# Patient Record
Sex: FEMALE | Race: WHITE | NOT HISPANIC OR LATINO | Employment: UNEMPLOYED | URBAN - METROPOLITAN AREA
[De-identification: names, ages, dates, MRNs, and addresses within clinical notes are randomized per-mention and may not be internally consistent; named-entity substitution may affect disease eponyms.]

---

## 2022-05-23 ENCOUNTER — TRANSFERRED RECORDS (OUTPATIENT)
Dept: HEALTH INFORMATION MANAGEMENT | Facility: CLINIC | Age: 62
End: 2022-05-23

## 2022-06-08 ENCOUNTER — TRANSFERRED RECORDS (OUTPATIENT)
Dept: HEALTH INFORMATION MANAGEMENT | Facility: CLINIC | Age: 62
End: 2022-06-08

## 2022-06-15 ENCOUNTER — TRANSFERRED RECORDS (OUTPATIENT)
Dept: HEALTH INFORMATION MANAGEMENT | Facility: CLINIC | Age: 62
End: 2022-06-15

## 2022-06-16 ENCOUNTER — TRANSFERRED RECORDS (OUTPATIENT)
Dept: HEALTH INFORMATION MANAGEMENT | Facility: CLINIC | Age: 62
End: 2022-06-16

## 2022-06-20 ENCOUNTER — DOCUMENTATION ONLY (OUTPATIENT)
Dept: ONCOLOGY | Facility: CLINIC | Age: 62
End: 2022-06-20

## 2022-06-20 ENCOUNTER — PATIENT OUTREACH (OUTPATIENT)
Dept: ONCOLOGY | Facility: CLINIC | Age: 62
End: 2022-06-20

## 2022-06-20 ENCOUNTER — TRANSCRIBE ORDERS (OUTPATIENT)
Dept: OTHER | Age: 62
End: 2022-06-20

## 2022-06-20 DIAGNOSIS — N83.209 CYST OF OVARY, UNSPECIFIED LATERALITY: Primary | ICD-10-CM

## 2022-06-20 NOTE — PROGRESS NOTES
RECORDS STATUS - ALL OTHER DIAGNOSIS      Action    Action Taken 6/21/2022 9:45AM EUGENIE WEBER called the [facility] [phone] - Fax: [phone]- I faxed over a formal request for records and imaging    6/27/2022 8:29AM EUGENIE  [facility] records are in EPIC     RECORDS RECEIVED FROM: [facility]/ [facility]    DATE RECEIVED: 6/28/2022   NOTES STATUS DETAILS   OFFICE NOTE from referring provider Complete Epic  Referred by Leandra Giles from [facility]     OFFICE NOTE from medical oncologist Complete [facility] records are in EPIC   DISCHARGE SUMMARY from hospital     CLINICAL TRIAL TREATMENTS TO DATE     LABS     PATHOLOGY REPORTS     ANYTHING RELATED TO DIAGNOSIS Complete [facility] labs are in Epic   GENONOMIC TESTING     TYPE:     IMAGING (NEED IMAGES & REPORT)     CT SCANS Complete  CT Abdomen Pelvis 6/8/2022   MRI     MAMMO     ULTRASOUND Complete US Renal 5/23/2022    PET

## 2022-06-20 NOTE — PROGRESS NOTES
New Patient Hematology / Oncology Nurse Navigator Note     Referral Date: 22    Referring provider:   Leandra Giles from [facility]   ph: [phone] fax: [phone]     Referring Clinic/Organization: [facility]     Referred to: GynOnc    Requested provider (if applicable): First available - did not specify      Evaluation for :       Clinical History (per Nurse review of records provided):      CT 22 showin/23 Renal US (with incidental finding) showing:         Clinical Assessment / Barriers to Care (Per Nurse):    Pt has MS and difficulty traveling     Records Location: Faxed - Media tab/Scanned (not yet scanned, reviewed via Email)    Records Needed:   [facility] will need results prior to visit with GynOnc and possible US? But may not do US prior to visit with GynOnc    Additional testing needed prior to consult:      [facility]     Referral updates and Plan:   Contacted patient to discuss referral. Spoke with patient and her sister-in-law trying to coordinate visit this week. [facility] . Discussed appointment at [place] that day if possible, which patient feels will be too much for one day. She is hoping for  location.     Reviewed with Dr. Golden/RNCC to advise on urgency. Dr. Golden advised patient be scheduled in next available opening, unable to accommodate at  location this week.      Contacted patient/sister-in-law to discuss appointment at  location  which they accept. Will route to scheduling to coordinate visit as discussed at 10AM with Dr. Hernandez.     Marianne Andrew, BSN, RN, PHN, OCN  Hematology/Oncology Nurse Navigator  [facility]  [phone]

## 2022-06-20 NOTE — PROGRESS NOTES
Action June 20, 2022 11:05 AM ABT   Action Taken [facility] received and sent to HIM for upload

## 2022-06-27 RX ORDER — HEPARIN SODIUM 10000 [USP'U]/ML
5000 INJECTION, SOLUTION INTRAVENOUS; SUBCUTANEOUS
Status: CANCELLED | OUTPATIENT
Start: 2022-06-27

## 2022-06-27 RX ORDER — PHENAZOPYRIDINE HYDROCHLORIDE 100 MG/1
200 TABLET, FILM COATED ORAL ONCE
Status: CANCELLED | OUTPATIENT
Start: 2022-06-27 | End: 2022-06-27

## 2022-06-28 ENCOUNTER — LAB (OUTPATIENT)
Dept: ONCOLOGY | Facility: CLINIC | Age: 62
End: 2022-06-28
Attending: OBSTETRICS & GYNECOLOGY
Payer: MEDICARE

## 2022-06-28 ENCOUNTER — PRE VISIT (OUTPATIENT)
Dept: ONCOLOGY | Facility: CLINIC | Age: 62
End: 2022-06-28

## 2022-06-28 VITALS
RESPIRATION RATE: 16 BRPM | SYSTOLIC BLOOD PRESSURE: 132 MMHG | OXYGEN SATURATION: 97 % | HEART RATE: 59 BPM | DIASTOLIC BLOOD PRESSURE: 82 MMHG | TEMPERATURE: 97.6 F

## 2022-06-28 DIAGNOSIS — N83.8 OVARIAN MASS: Primary | ICD-10-CM

## 2022-06-28 LAB
ABO/RH(D): NORMAL
ALBUMIN SERPL-MCNC: 3.6 G/DL (ref 3.4–5)
ALP SERPL-CCNC: 103 U/L (ref 40–150)
ALT SERPL W P-5'-P-CCNC: 104 U/L (ref 0–50)
ANION GAP SERPL CALCULATED.3IONS-SCNC: 5 MMOL/L (ref 3–14)
ANTIBODY SCREEN: NEGATIVE
AST SERPL W P-5'-P-CCNC: 57 U/L (ref 0–45)
BASOPHILS # BLD AUTO: 0 10E3/UL (ref 0–0.2)
BASOPHILS NFR BLD AUTO: 0 %
BILIRUB SERPL-MCNC: 0.6 MG/DL (ref 0.2–1.3)
BUN SERPL-MCNC: 23 MG/DL (ref 7–30)
CALCIUM SERPL-MCNC: 9.2 MG/DL (ref 8.5–10.1)
CHLORIDE BLD-SCNC: 111 MMOL/L (ref 94–109)
CO2 SERPL-SCNC: 24 MMOL/L (ref 20–32)
CREAT SERPL-MCNC: 0.58 MG/DL (ref 0.52–1.04)
EOSINOPHIL # BLD AUTO: 0.1 10E3/UL (ref 0–0.7)
EOSINOPHIL NFR BLD AUTO: 1 %
ERYTHROCYTE [DISTWIDTH] IN BLOOD BY AUTOMATED COUNT: 15 % (ref 10–15)
GFR SERPL CREATININE-BSD FRML MDRD: >90 ML/MIN/1.73M2
GLUCOSE BLD-MCNC: 93 MG/DL (ref 70–99)
HCT VFR BLD AUTO: 45.3 % (ref 35–47)
HGB BLD-MCNC: 14.5 G/DL (ref 11.7–15.7)
IMM GRANULOCYTES # BLD: 0 10E3/UL
IMM GRANULOCYTES NFR BLD: 0 %
LYMPHOCYTES # BLD AUTO: 2.3 10E3/UL (ref 0.8–5.3)
LYMPHOCYTES NFR BLD AUTO: 26 %
MCH RBC QN AUTO: 30 PG (ref 26.5–33)
MCHC RBC AUTO-ENTMCNC: 32 G/DL (ref 31.5–36.5)
MCV RBC AUTO: 94 FL (ref 78–100)
MONOCYTES # BLD AUTO: 0.7 10E3/UL (ref 0–1.3)
MONOCYTES NFR BLD AUTO: 8 %
NEUTROPHILS # BLD AUTO: 5.7 10E3/UL (ref 1.6–8.3)
NEUTROPHILS NFR BLD AUTO: 65 %
NRBC # BLD AUTO: 0 10E3/UL
NRBC BLD AUTO-RTO: 0 /100
PLATELET # BLD AUTO: 219 10E3/UL (ref 150–450)
POTASSIUM BLD-SCNC: 3.9 MMOL/L (ref 3.4–5.3)
PROT SERPL-MCNC: 7.3 G/DL (ref 6.8–8.8)
RBC # BLD AUTO: 4.84 10E6/UL (ref 3.8–5.2)
SODIUM SERPL-SCNC: 140 MMOL/L (ref 133–144)
SPECIMEN EXPIRATION DATE: NORMAL
WBC # BLD AUTO: 8.9 10E3/UL (ref 4–11)

## 2022-06-28 PROCEDURE — 85025 COMPLETE CBC W/AUTO DIFF WBC: CPT | Performed by: OBSTETRICS & GYNECOLOGY

## 2022-06-28 PROCEDURE — 82040 ASSAY OF SERUM ALBUMIN: CPT | Performed by: OBSTETRICS & GYNECOLOGY

## 2022-06-28 PROCEDURE — G0463 HOSPITAL OUTPT CLINIC VISIT: HCPCS

## 2022-06-28 PROCEDURE — 99205 OFFICE O/P NEW HI 60 MIN: CPT | Performed by: OBSTETRICS & GYNECOLOGY

## 2022-06-28 PROCEDURE — 36415 COLL VENOUS BLD VENIPUNCTURE: CPT

## 2022-06-28 PROCEDURE — 86850 RBC ANTIBODY SCREEN: CPT | Performed by: OBSTETRICS & GYNECOLOGY

## 2022-06-28 PROCEDURE — 80053 COMPREHEN METABOLIC PANEL: CPT | Performed by: OBSTETRICS & GYNECOLOGY

## 2022-06-28 PROCEDURE — 86901 BLOOD TYPING SEROLOGIC RH(D): CPT | Performed by: OBSTETRICS & GYNECOLOGY

## 2022-06-28 RX ORDER — CARBOXYMETHYLCELLULOSE SODIUM 5 MG/ML
SOLUTION/ DROPS OPHTHALMIC
COMMUNITY
Start: 2021-07-23

## 2022-06-28 RX ORDER — IPRATROPIUM BROMIDE 42 UG/1
SPRAY, METERED NASAL
COMMUNITY
Start: 2022-06-01

## 2022-06-28 RX ORDER — ACETAMINOPHEN 500 MG
1000 TABLET ORAL
COMMUNITY
Start: 2022-01-19

## 2022-06-28 RX ORDER — CETIRIZINE HYDROCHLORIDE 10 MG/1
10 TABLET ORAL
COMMUNITY
Start: 2022-06-01

## 2022-06-28 RX ORDER — ERGOCALCIFEROL 1.25 MG/1
1250 CAPSULE ORAL
COMMUNITY
Start: 2022-06-02

## 2022-06-28 RX ORDER — BACLOFEN 10 MG/1
TABLET ORAL
COMMUNITY
Start: 2022-05-27

## 2022-06-28 RX ORDER — LIDOCAINE 40 MG/G
CREAM TOPICAL
COMMUNITY
Start: 2022-01-19

## 2022-06-28 RX ORDER — HYDROCODONE BITARTRATE AND ACETAMINOPHEN 5; 325 MG/1; MG/1
TABLET ORAL
COMMUNITY
Start: 2022-06-01

## 2022-06-28 RX ORDER — IBUPROFEN 600 MG/1
600 TABLET, FILM COATED ORAL
COMMUNITY
Start: 2022-04-12

## 2022-06-28 ASSESSMENT — PAIN SCALES - GENERAL: PAINLEVEL: MODERATE PAIN (5)

## 2022-06-28 NOTE — PROGRESS NOTES
Consult Notes on Referred Patient            RE: Rae Hernandez  : 1960  QUAN: 2022      I had the pleasure of seeing your patient Rae Hernandez here at the Gynecologic Cancer Clinic at the [facility] on 2022.  As you know she is a very pleasant 62 year old woman with a recent diagnosis of ovarian cyst.  Given these findings she was subsequently sent to the Gynecologic Cancer Clinic for new patient consultation.  She is accompanied today by her sister in law.    She was undergoing work up for urinary frequency/urgency and baseline incontinence and was found to have a large pelvic mass.  She denies any constipation, pain.  She is eating and drinking normally.  She does note she has had some abdominal bloating lately. Of note, she is undergoing evaluation for possible new liver disease as her LFT's were found to be mildly elevated on routine lab work.  She has a liver US scheduled for .    22:  US renal (personally reviewed):  Large cystic mass above the bladder measuring up to 14.2 cm and may contain echogenic debris.    22:  CT A/P (personally reviewed):  1.  Large ovarian cystic lesions in the pelvis measuring up to 15.4 cm.  Due to mass effect from the large size of the lesion, it is difficult to determine which ovary this mass originates from.  No metastatic disease identified.      22:   = 8    Obstetrics and Gynecology History:  G0  Menopause at age 52, no HRT  No PMB      Past Medical History:  Past Medical History:   Diagnosis Date     MS (multiple sclerosis) (H)      Neurogenic bladder      Neurogenic bowel      Spastic hemiplegia (H)        Past Surgical History:  Past Surgical History:   Procedure Laterality Date     TONSILLECTOMY         Health Maintenance:  Last Pap Smear: 20 years ago              Result: normal  She has not had a history of abnormal Pap smears.    Last Mammogram: Many years ago              Result: normal      She has not had  a history of abnormal mammograms.    Last Colonoscopy: Never-pt declines       Social History:  Lives with her sister in law and brother, feels safe at home.  Retired from the [employer].  Enjoys feeding the birds, gardening.  Her wife passed away in 2020 and she moved to [place] from [place].  Does not have an advanced directive on file and would like her sister in law to be her POA.  Would like full resuscitation if reversible cause is identified, however would not like to be kept on life sustaining measures long-term.     Family History:   The patient's family history is significant for.  Family History   Problem Relation Age of Onset     Lung Cancer Mother      Breast Cancer Sister      Lung Cancer Sister          Physical Exam:   /82   Pulse 59   Temp 97.6  F (36.4  C) (Tympanic)   Resp 16   SpO2 97%   There is no height or weight on file to calculate BMI.    General Appearance: healthy and alert, no distress        Cardiovascular: regular rate and rhythm    Respiratory: lungs clear     Labs:  WBC 8.9 with ANC 5.7.  Hemoglobin 14.5.  Platelets 219.  Creatinine 0.58.  Potassium 3.9.  Magnesium -.  Remainder of electrolytes within normal limits.  AST 57, , alkaline phosphatase 103, total bilirubin 0.6.  Albumin 3.6.      Assessment:    Rae Hernandez is a 62 year old woman with a new diagnosis of ovarian cyst.     A total of 60 minutes was spent on patient care today.       Plan:     1.)    Ovarian cyst-we reviewed the etiologies including benign, malignant and borderline.  Given the normal  and CT without evidence of mestastatic disease, we discussed that this is most likely a benign cyst.  However, given the size, it is unlikely to resolve spontaneously. She is also has significant urinary symptoms secondary to mass effect.  We discussed the rationale for not performing a biopsy of the ovary.  We discussed my recommendation for bilateral oophorectomy regardless of pathology given her age.  We discussed  the role of frozen section analysis and that further surgical procedures would be based on these findings.  Risks, benefits, indications and alternatives were reviewed.  All her questions were answered and she will undergo laparoscopic removal of both ovaries and fallopian tubes, possible cancer surgery on 7/27 at [facility].  She will have a pre-operative optimization visit with her PCP prior to surgery especially in light of her newly elevated LFT.     2.) Genetic risk factors were assessed and the patient does not meet the qualifications for a referral unless malignancy is found.      3.) Labs and/or tests ordered include:  CBC, CMP, T/S, EKG.     4.) Health maintenance issues addressed today include pt is due for colonoscopy, mammogram and pap which she declines all screening exams.    5.) Pre-op teaching was completed today.        Thank you for allowing us to participate in the care of your patient.         Sincerely,    Adenike Hernandez MD  Gynecologic Oncology  [facility]       CC

## 2022-06-28 NOTE — LETTER
2022         RE: Rae Hernandez  [address] [place] [address]        Dear Colleague,    Thank you for referring your patient, Rae Hernandez, to the [facility]. Please see a copy of my visit note below.    Consult Notes on Referred Patient            RE: Rae Hernandez  : 1960  QUAN: 2022      I had the pleasure of seeing your patient Rae Hernandez here at the Gynecologic Cancer Clinic at the [facility] on 2022.  As you know she is a very pleasant 62 year old woman with a recent diagnosis of ovarian cyst.  Given these findings she was subsequently sent to the Gynecologic Cancer Clinic for new patient consultation.  She is accompanied today by her sister in law.    She was undergoing work up for urinary frequency/urgency and baseline incontinence and was found to have a large pelvic mass.  She denies any constipation, pain.  She is eating and drinking normally.  She does note she has had some abdominal bloating lately. Of note, she is undergoing evaluation for possible new liver disease as her LFT's were found to be mildly elevated on routine lab work.  She has a liver US scheduled for .    22:  US renal (personally reviewed):  Large cystic mass above the bladder measuring up to 14.2 cm and may contain echogenic debris.    22:  CT A/P (personally reviewed):  1.  Large ovarian cystic lesions in the pelvis measuring up to 15.4 cm.  Due to mass effect from the large size of the lesion, it is difficult to determine which ovary this mass originates from.  No metastatic disease identified.      22:   = 8    Obstetrics and Gynecology History:  G0  Menopause at age 52, no HRT  No PMB      Past Medical History:  Past Medical History:   Diagnosis Date     MS (multiple sclerosis) (H)      Neurogenic bladder      Neurogenic bowel      Spastic hemiplegia (H)        Past Surgical History:  Past Surgical History:    Procedure Laterality Date     TONSILLECTOMY         Health Maintenance:  Last Pap Smear: 20 years ago              Result: normal  She has not had a history of abnormal Pap smears.    Last Mammogram: Many years ago              Result: normal      She has not had a history of abnormal mammograms.    Last Colonoscopy: Never-pt declines       Social History:  Lives with her sister in law and brother, feels safe at home.  Retired from the [employer].  Enjoys feeding the birds, gardening.  Her wife passed away in 2020 and she moved to [place] from [place].  Does not have an advanced directive on file and would like her sister in law to be her POA.  Would like full resuscitation if reversible cause is identified, however would not like to be kept on life sustaining measures long-term.     Family History:   The patient's family history is significant for.  Family History   Problem Relation Age of Onset     Lung Cancer Mother      Breast Cancer Sister      Lung Cancer Sister          Physical Exam:   /82   Pulse 59   Temp 97.6  F (36.4  C) (Tympanic)   Resp 16   SpO2 97%   There is no height or weight on file to calculate BMI.    General Appearance: healthy and alert, no distress        Cardiovascular: regular rate and rhythm    Respiratory: lungs clear     Labs:  WBC 8.9 with ANC 5.7.  Hemoglobin 14.5.  Platelets 219.  Creatinine 0.58.  Potassium 3.9.  Magnesium -.  Remainder of electrolytes within normal limits.  AST 57, , alkaline phosphatase 103, total bilirubin 0.6.  Albumin 3.6.      Assessment:    Rae Hernandez is a 62 year old woman with a new diagnosis of ovarian cyst.     A total of 60 minutes was spent on patient care today.       Plan:     1.)    Ovarian cyst-we reviewed the etiologies including benign, malignant and borderline.  Given the normal  and CT without evidence of mestastatic disease, we discussed that this is most likely a benign cyst.  However, given the size, it is unlikely to resolve  spontaneously. She is also has significant urinary symptoms secondary to mass effect.  We discussed the rationale for not performing a biopsy of the ovary.  We discussed my recommendation for bilateral oophorectomy regardless of pathology given her age.  We discussed the role of frozen section analysis and that further surgical procedures would be based on these findings.  Risks, benefits, indications and alternatives were reviewed.  All her questions were answered and she will undergo laparoscopic removal of both ovaries and fallopian tubes, possible cancer surgery on 7/27 at [facility].  She will have a pre-operative optimization visit with her PCP prior to surgery especially in light of her newly elevated LFT.     2.) Genetic risk factors were assessed and the patient does not meet the qualifications for a referral unless malignancy is found.      3.) Labs and/or tests ordered include:  CBC, CMP, T/S, EKG.     4.) Health maintenance issues addressed today include pt is due for colonoscopy, mammogram and pap which she declines all screening exams.    5.) Pre-op teaching was completed today.        Thank you for allowing us to participate in the care of your patient.         Sincerely,    Adenike Hernandez MD  Gynecologic Oncology  [facility]       CC          Again, thank you for allowing me to participate in the care of your patient.        Sincerely,        Waylon Hernandez MD

## 2022-06-28 NOTE — PROGRESS NOTES
Medical Assistant Note:  Rae Hernandez presents today for BLOOD DRAW.    Patient seen by provider today: Yes: .   present during visit today: Not Applicable.    Concerns: No Concerns.    Procedure:  Lab draw site: right antecub, Needle type: butterfly, Gauge: 23.    Post Assessment:  Labs drawn without difficulty: Yes.    Discharge Plan:  Departure Mode: Wheelchair.    Face to Face Time: 10.    Natali Tovar, CMA

## 2022-06-28 NOTE — PATIENT INSTRUCTIONS
You have been scheduled for surgery on: 7/27 at [facility]    Diagnosis:  Ovarian mass    The surgical procedure is: laparoscopic removal of both ovaries and fallopian tubes, possible cancer surgery    Anticipated length of surgery: 1-3 hrs.  You will be in the recovery room for approximately 2-3 hrs after surgery.  Your family will not be able to see you until after you leave the recovery room.    Length of hospital stay:  This is an outpatient, extended stay surgery.  You will be discharged same day if you meet criteria for discharge.  If you have a larger surgical incision, you will need to be in the hospital 2-3 days.  ______________________________________________________________________    Preparation for Surgery:    To Schedule   1.  Labs:  CBC, CMP, T/S, orders placed, please perform today   2.  EKG:  Order placed, please perform today   3.  Post-operative exam with me 1-2 weeks following surgery      Postoperative Restrictions:  No heavy lifting >20lbs for six weeks, nothing in the vagina (no tampons, no intercourse, no douching) for eight weeks only if hysterectomy is required.     Postoperative visit:  Return to clinic 1-2 weeks after surgery for post operative visit.      Please contact the clinic with any questions or concerns.    Adenike Hernandez MD  Gynecologic Oncology  [facility]

## 2022-06-28 NOTE — NURSING NOTE
Oncology Rooming Note    June 28, 2022 10:22 AM   Rae Hernandez is a 62 year old female who presents for:    Chief Complaint   Patient presents with     Oncology Clinic Visit     New Patient     Initial Vitals: /82   Pulse 59   Temp 97.6  F (36.4  C) (Tympanic)   Resp 16   SpO2 97%  There is no height or weight on file to calculate BMI. There is no height or weight on file to calculate BSA.  Moderate Pain (5) Comment: Data Unavailable   No LMP recorded.  Allergies reviewed: Yes  Medications reviewed: Yes    Medications: Medication refills not needed today.  Pharmacy name entered into EPIC: Data Unavailable    Clinical concerns: new patient       Natali Tovar, CMA

## 2022-07-05 ENCOUNTER — TELEPHONE (OUTPATIENT)
Dept: ONCOLOGY | Facility: CLINIC | Age: 62
End: 2022-07-05

## 2022-07-05 NOTE — TELEPHONE ENCOUNTER
RN Care Coordinator: Gerda Galarza; [phone]    Surgery is scheduled with Dr. Hernandez on 7/27 at the [facility]  Scheduled per surgeon request    H&P to be completed by Surgeon     COVID-19 test: patient to complete an at-home test 1-2 days prior to scheduled date and bring a picture of negative results or call [phone] option # 7 to schedule a PCR test within 4 days of the scheduled date     Post-op: 8/9, in person visit    Patient will receive a phone call from pre-admission nurses 1-2 days prior to surgery with arrival and start time.    I spoke with the patient and was able to confirm the scheduled information. No further action needed at this time.    Surgery packet was provided by the RNCC during appointment     

## 2022-07-07 NOTE — TELEPHONE ENCOUNTER
David Connors, MD Otis Silver Kayla 2 days ago     CR    Its fine that she had steroid injection today.  She will definitely need to take out her dentures during the surgery     Pt called to discuss questions pt had for Dr Hernandez and response.  Left message on mobile number to call back to discuss.  Writer would also like to arrange surgical eduction with pt over the phone on 7/11/2022 if possible.    Gerda Galarza RN, BSN, OCN

## 2022-07-08 ENCOUNTER — PATIENT OUTREACH (OUTPATIENT)
Dept: ONCOLOGY | Facility: CLINIC | Age: 62
End: 2022-07-08

## 2022-07-08 NOTE — TELEPHONE ENCOUNTER
Adenike Chinchilla MD Tschida, Kayla 2 days ago      CR     Its fine that she had steroid injection today.  She will definitely need to take out her dentures during the surgery     Patient called back today.  Reviewed above information with her.  Patient voiced understanding.  Pre-op education also completed with patient today.  See pre-op instructions note.    While on the phone with patient doing pre-op education, she explained that she has mobility issues and it would be very difficult for her to do 1 shower with the CHG antibacterial soap and impossible to do 2 showers (one the night before surgery and one the morning of surgery).  Pt went on to explain that she has mobility issues and has someone come to her residence to help her do a shower once a week.  Encouraged patient to do the best that she can do with the resources she has available to her.  Explained the importance and reasoning behind the showers with the CHG soap to reduce risk of infection.  Writer will route message to Dr. Hernandez and Dr. David Box's RNCC, for any further advice or recommendations.      Janki Ohara, RN, BSN    RN Care Coordinator  [facility]  [phone]

## 2022-07-08 NOTE — PROGRESS NOTES
[facility]: Cancer Care Plan of Care Education Note                                    Discussion with Patient:                                                      Pre-op education completed with patient for surgery with Dr. Hernandez scheduled on 7/27/22.  Patient scheduled for laparoscopic removal of both ovaries and fallopian tubes, possible cancer surgery.      Assessment:                                                      Assessment completed with:: Patient;Other    Current living arrangement       Plan of Care Education   Diagnosis:: Ovarian Mass  Does patient understand diagnosis?: Yes  Does patient understand treatment plan/regimen?: Yes  Safety/self care at home reviewed with patient:: Yes  Coping - concerns/fears reviewed with patient:: Yes  Plan of Care:: MD follow-up appointment  When to call provider:: Bleeding;Increased shortness of breath;New/worsening pain;Shaking chills;Temperature >100.4F;Uncontrolled diarrhea/constipation;Uncontrolled nausea/vomiting    Evaluation of Learning  Patient Education Provided: Yes  Readiness:: Acceptance  Method:: Literature;Explanation  Response:: Verbalizes understanding      Intervention/Education provided during outreach:                                                       GYN ONC Pre-Op Education - Nursing     Relevant Diagnosis: Ovarian Mass    Teaching Topic: pre-op education    Teaching Concerns addressed: Yes    Patient verbalizes understanding the following:   Reason for the appointment, diagnosis and treatment plan:   Yes   Knowledge of proper use of medications and conditions for which they are ordered (with special attention to potential side effects or drug interactions): Yes   Which situations necessitate calling provider and whom to contact: Yes       Nutritional needs and diet plan:  Yes      Pain management techniques:  Yes  Diet:  Yes     Infection Prevention:  Patient verbalized understanding the following:   Pre-Op CHG Bathing Instructions:  Yes  Surgical procedure site care taught:   Yes   Signs and symptoms of infection taught: Yes     Instructional Materials Used/Given:  [facility] Preparing for Your Surgery  Showering or Bathing before Surgery Instructions & CHG Product (2 bottles)  Home Care after Major Abdominal or Vaginal Surgery  Tips to Increase the Protein in Your Diet  Using [employer]  Phone number for [facility]     Comments:  Met with pt for pre-op teaching over the phone for surgery on 7/27/22 at [facility]:  Reviewed Covid 19 testing instructions: COVID-19 test: patient to complete an at-home test 1-2 days prior to scheduled date and bring a picture of negative results or call [phone] option # 7 to schedule a PCR test within 4 days of the scheduled date  Reviewed NPO restrictions prior to surgery with pt (No food or milk products 8 hours prior to surgery; Only clear liquids up to 2 hours prior to surgery i.e., water, black coffee, tea, apple juice).  Also reviewed medications that must be held for at least 7 days prior to surgery (Aspirin, Ibuprofen, Naproxen, Fish oil/Flax seed oil, Multivitamin/Vit. E, or any other product containing aspirin, or NSAIDs).  Per Dr Hernandez, pt should her medications with PCP during pre-op appointment. Pt will need to see Dr Hernandez, 1-2 weeks after her surgery(scheduled 8/9/22). Pt will need someone to drive her home after surgery, and someone to stay with her for at least 24 hours after she arrives home. Reviewed with pt signs and symptoms that would warrant contacting provider immediately.  Also reviewed lifting restrictions after surgery with pt.  Pt had the opportunity to ask questions, and all questions were answered to her satisfaction.  Pre-op form was faxed to the [facility] pre-admissions at [phone].  Patient verbalized understanding and agreement with plan.  Pt was instructed to call the clinic with any questions, concerns, or worsening symptoms.     Janki Ohara, RN, BSN    RN  Care Coordinator  [facility]  [phone]

## 2022-07-13 NOTE — TELEPHONE ENCOUNTER
David Connors, Adenike Bailon MD  You; Janki Ohara, RN 5 days ago     CR    Thanks. This is fine. Whenever the shower she takes closest to coming to the hospital is she should use that soap. Then we have wipes that we can do a sponge bath with nursing when she gets to the hospital     Pt called and instructed as noted.  Pt states she was able to change her caregiver appt at home and he will come to the home on the day prior to surgery for first anti-bacterial shower.  Pt informed that writer will contact preop nurse about completing bath on morning of surgery as noted above.  Pt verbalized understanding of plan.    Gerda Galarza, RN, BSN, OCN

## 2022-07-22 ENCOUNTER — TRANSFERRED RECORDS (OUTPATIENT)
Dept: HEALTH INFORMATION MANAGEMENT | Facility: CLINIC | Age: 62
End: 2022-07-22

## 2022-07-26 ENCOUNTER — ANESTHESIA EVENT (OUTPATIENT)
Dept: SURGERY | Facility: CLINIC | Age: 62
End: 2022-07-26
Payer: COMMERCIAL

## 2022-07-27 ENCOUNTER — HOSPITAL ENCOUNTER (OUTPATIENT)
Facility: CLINIC | Age: 62
Discharge: HOME | End: 2022-07-27
Attending: OBSTETRICS & GYNECOLOGY | Admitting: OBSTETRICS & GYNECOLOGY
Payer: COMMERCIAL

## 2022-07-27 ENCOUNTER — ANESTHESIA (OUTPATIENT)
Dept: SURGERY | Facility: CLINIC | Age: 62
End: 2022-07-27
Payer: COMMERCIAL

## 2022-07-27 VITALS
BODY MASS INDEX: 27.63 KG/M2 | RESPIRATION RATE: 14 BRPM | HEART RATE: 103 BPM | DIASTOLIC BLOOD PRESSURE: 91 MMHG | TEMPERATURE: 98.2 F | SYSTOLIC BLOOD PRESSURE: 156 MMHG | OXYGEN SATURATION: 98 % | WEIGHT: 193 LBS | HEIGHT: 70 IN

## 2022-07-27 DIAGNOSIS — Z90.722 S/P BSO (BILATERAL SALPINGO-OOPHORECTOMY): Primary | ICD-10-CM

## 2022-07-27 PROBLEM — N83.8 OVARIAN MASS: Status: ACTIVE | Noted: 2022-07-27

## 2022-07-27 LAB
GLUCOSE BLDC GLUCOMTR-MCNC: 86 MG/DL (ref 70–99)
PATH REPORT.COMMENTS IMP SPEC: NORMAL
PATH REPORT.FINAL DX SPEC: NORMAL
PATH REPORT.GROSS SPEC: NORMAL
PATH REPORT.MICROSCOPIC SPEC OTHER STN: NORMAL
PATH REPORT.RELEVANT HX SPEC: NORMAL

## 2022-07-27 PROCEDURE — 710N000012 HC RECOVERY PHASE 2, PER MINUTE: Performed by: OBSTETRICS & GYNECOLOGY

## 2022-07-27 PROCEDURE — 88112 CYTOPATH CELL ENHANCE TECH: CPT | Mod: TC | Performed by: OBSTETRICS & GYNECOLOGY

## 2022-07-27 PROCEDURE — 999N000141 HC STATISTIC PRE-PROCEDURE NURSING ASSESSMENT: Performed by: OBSTETRICS & GYNECOLOGY

## 2022-07-27 PROCEDURE — 250N000013 HC RX MED GY IP 250 OP 250 PS 637: Performed by: ANESTHESIOLOGY

## 2022-07-27 PROCEDURE — 88112 CYTOPATH CELL ENHANCE TECH: CPT | Mod: 26 | Performed by: PATHOLOGY

## 2022-07-27 PROCEDURE — 360N000076 HC SURGERY LEVEL 3, PER MIN: Performed by: OBSTETRICS & GYNECOLOGY

## 2022-07-27 PROCEDURE — 250N000011 HC RX IP 250 OP 636: Performed by: STUDENT IN AN ORGANIZED HEALTH CARE EDUCATION/TRAINING PROGRAM

## 2022-07-27 PROCEDURE — 88331 PATH CONSLTJ SURG 1 BLK 1SPC: CPT | Mod: TC | Performed by: OBSTETRICS & GYNECOLOGY

## 2022-07-27 PROCEDURE — 82962 GLUCOSE BLOOD TEST: CPT

## 2022-07-27 PROCEDURE — 250N000009 HC RX 250: Performed by: STUDENT IN AN ORGANIZED HEALTH CARE EDUCATION/TRAINING PROGRAM

## 2022-07-27 PROCEDURE — 272N000001 HC OR GENERAL SUPPLY STERILE: Performed by: OBSTETRICS & GYNECOLOGY

## 2022-07-27 PROCEDURE — 250N000013 HC RX MED GY IP 250 OP 250 PS 637: Performed by: OBSTETRICS & GYNECOLOGY

## 2022-07-27 PROCEDURE — 710N000010 HC RECOVERY PHASE 1, LEVEL 2, PER MIN: Performed by: OBSTETRICS & GYNECOLOGY

## 2022-07-27 PROCEDURE — 250N000011 HC RX IP 250 OP 636: Performed by: ANESTHESIOLOGY

## 2022-07-27 PROCEDURE — 250N000025 HC SEVOFLURANE, PER MIN: Performed by: OBSTETRICS & GYNECOLOGY

## 2022-07-27 PROCEDURE — 250N000011 HC RX IP 250 OP 636: Performed by: OBSTETRICS & GYNECOLOGY

## 2022-07-27 PROCEDURE — 88305 TISSUE EXAM BY PATHOLOGIST: CPT | Mod: TC | Performed by: OBSTETRICS & GYNECOLOGY

## 2022-07-27 PROCEDURE — 258N000003 HC RX IP 258 OP 636: Performed by: STUDENT IN AN ORGANIZED HEALTH CARE EDUCATION/TRAINING PROGRAM

## 2022-07-27 PROCEDURE — 370N000017 HC ANESTHESIA TECHNICAL FEE, PER MIN: Performed by: OBSTETRICS & GYNECOLOGY

## 2022-07-27 RX ORDER — ONDANSETRON 2 MG/ML
INJECTION INTRAMUSCULAR; INTRAVENOUS PRN
Status: DISCONTINUED | OUTPATIENT
Start: 2022-07-27 | End: 2022-07-27

## 2022-07-27 RX ORDER — PSEUDOEPHEDRINE HCL 30 MG
30 TABLET ORAL EVERY 4 HOURS PRN
COMMUNITY

## 2022-07-27 RX ORDER — PROPOFOL 10 MG/ML
INJECTION, EMULSION INTRAVENOUS PRN
Status: DISCONTINUED | OUTPATIENT
Start: 2022-07-27 | End: 2022-07-27

## 2022-07-27 RX ORDER — OXYCODONE HYDROCHLORIDE 5 MG/1
5 TABLET ORAL
Status: DISCONTINUED | OUTPATIENT
Start: 2022-07-27 | End: 2022-07-27 | Stop reason: HOSPADM

## 2022-07-27 RX ORDER — SODIUM CHLORIDE, SODIUM LACTATE, POTASSIUM CHLORIDE, CALCIUM CHLORIDE 600; 310; 30; 20 MG/100ML; MG/100ML; MG/100ML; MG/100ML
INJECTION, SOLUTION INTRAVENOUS CONTINUOUS
Status: DISCONTINUED | OUTPATIENT
Start: 2022-07-27 | End: 2022-07-27 | Stop reason: HOSPADM

## 2022-07-27 RX ORDER — IBUPROFEN 200 MG
800 TABLET ORAL ONCE
Status: DISCONTINUED | OUTPATIENT
Start: 2022-07-27 | End: 2022-07-27 | Stop reason: HOSPADM

## 2022-07-27 RX ORDER — ALBUTEROL SULFATE 0.83 MG/ML
2.5 SOLUTION RESPIRATORY (INHALATION) EVERY 4 HOURS PRN
Status: DISCONTINUED | OUTPATIENT
Start: 2022-07-27 | End: 2022-07-27 | Stop reason: HOSPADM

## 2022-07-27 RX ORDER — DEXAMETHASONE SODIUM PHOSPHATE 4 MG/ML
INJECTION, SOLUTION INTRA-ARTICULAR; INTRALESIONAL; INTRAMUSCULAR; INTRAVENOUS; SOFT TISSUE PRN
Status: DISCONTINUED | OUTPATIENT
Start: 2022-07-27 | End: 2022-07-27

## 2022-07-27 RX ORDER — HEPARIN SODIUM 5000 [USP'U]/.5ML
5000 INJECTION, SOLUTION INTRAVENOUS; SUBCUTANEOUS
Status: COMPLETED | OUTPATIENT
Start: 2022-07-27 | End: 2022-07-27

## 2022-07-27 RX ORDER — LIDOCAINE HYDROCHLORIDE 20 MG/ML
INJECTION, SOLUTION INFILTRATION; PERINEURAL PRN
Status: DISCONTINUED | OUTPATIENT
Start: 2022-07-27 | End: 2022-07-27

## 2022-07-27 RX ORDER — HYDROMORPHONE HCL IN WATER/PF 6 MG/30 ML
0.2 PATIENT CONTROLLED ANALGESIA SYRINGE INTRAVENOUS EVERY 5 MIN PRN
Status: DISCONTINUED | OUTPATIENT
Start: 2022-07-27 | End: 2022-07-27 | Stop reason: HOSPADM

## 2022-07-27 RX ORDER — IBUPROFEN 800 MG/1
800 TABLET, FILM COATED ORAL EVERY 6 HOURS PRN
Qty: 30 TABLET | Refills: 0 | COMMUNITY
Start: 2022-07-27

## 2022-07-27 RX ORDER — ACETAMINOPHEN 325 MG/1
975 TABLET ORAL ONCE
Status: COMPLETED | OUTPATIENT
Start: 2022-07-27 | End: 2022-07-27

## 2022-07-27 RX ORDER — OXYCODONE HYDROCHLORIDE 5 MG/1
5 TABLET ORAL EVERY 6 HOURS PRN
Qty: 6 TABLET | Refills: 0 | Status: SHIPPED | OUTPATIENT
Start: 2022-07-27 | End: 2022-07-30

## 2022-07-27 RX ORDER — HYDRALAZINE HYDROCHLORIDE 20 MG/ML
2.5-5 INJECTION INTRAMUSCULAR; INTRAVENOUS EVERY 10 MIN PRN
Status: DISCONTINUED | OUTPATIENT
Start: 2022-07-27 | End: 2022-07-27 | Stop reason: HOSPADM

## 2022-07-27 RX ORDER — FENTANYL CITRATE 50 UG/ML
INJECTION, SOLUTION INTRAMUSCULAR; INTRAVENOUS PRN
Status: DISCONTINUED | OUTPATIENT
Start: 2022-07-27 | End: 2022-07-27

## 2022-07-27 RX ORDER — METHOCARBAMOL 100 MG/ML
1000 INJECTION, SOLUTION INTRAMUSCULAR; INTRAVENOUS
Status: DISCONTINUED | OUTPATIENT
Start: 2022-07-27 | End: 2022-07-27 | Stop reason: HOSPADM

## 2022-07-27 RX ORDER — AMOXICILLIN 250 MG
1-2 CAPSULE ORAL 2 TIMES DAILY
Qty: 30 TABLET | Refills: 0 | Status: SHIPPED | OUTPATIENT
Start: 2022-07-27

## 2022-07-27 RX ORDER — OXYCODONE HYDROCHLORIDE 5 MG/1
5 TABLET ORAL
Status: COMPLETED | OUTPATIENT
Start: 2022-07-27 | End: 2022-07-27

## 2022-07-27 RX ORDER — ACETAMINOPHEN 325 MG/1
975 TABLET ORAL EVERY 6 HOURS PRN
Qty: 50 TABLET | Refills: 0 | COMMUNITY
Start: 2022-07-27

## 2022-07-27 RX ORDER — PHENAZOPYRIDINE HYDROCHLORIDE 200 MG/1
200 TABLET, FILM COATED ORAL ONCE
Status: COMPLETED | OUTPATIENT
Start: 2022-07-27 | End: 2022-07-27

## 2022-07-27 RX ORDER — DEXMEDETOMIDINE HYDROCHLORIDE 4 UG/ML
INJECTION, SOLUTION INTRAVENOUS PRN
Status: DISCONTINUED | OUTPATIENT
Start: 2022-07-27 | End: 2022-07-27

## 2022-07-27 RX ORDER — MAGNESIUM SULFATE HEPTAHYDRATE 40 MG/ML
INJECTION, SOLUTION INTRAVENOUS PRN
Status: DISCONTINUED | OUTPATIENT
Start: 2022-07-27 | End: 2022-07-27

## 2022-07-27 RX ORDER — SODIUM CHLORIDE, SODIUM LACTATE, POTASSIUM CHLORIDE, CALCIUM CHLORIDE 600; 310; 30; 20 MG/100ML; MG/100ML; MG/100ML; MG/100ML
INJECTION, SOLUTION INTRAVENOUS CONTINUOUS PRN
Status: DISCONTINUED | OUTPATIENT
Start: 2022-07-27 | End: 2022-07-27

## 2022-07-27 RX ORDER — HALOPERIDOL 5 MG/ML
1 INJECTION INTRAMUSCULAR
Status: DISCONTINUED | OUTPATIENT
Start: 2022-07-27 | End: 2022-07-27 | Stop reason: HOSPADM

## 2022-07-27 RX ORDER — METOPROLOL TARTRATE 1 MG/ML
1-2 INJECTION, SOLUTION INTRAVENOUS EVERY 5 MIN PRN
Status: DISCONTINUED | OUTPATIENT
Start: 2022-07-27 | End: 2022-07-27 | Stop reason: HOSPADM

## 2022-07-27 RX ORDER — HALOPERIDOL 5 MG/ML
2 INJECTION INTRAMUSCULAR
Status: DISCONTINUED | OUTPATIENT
Start: 2022-07-27 | End: 2022-07-27 | Stop reason: HOSPADM

## 2022-07-27 RX ORDER — ACETAMINOPHEN 325 MG/1
975 TABLET ORAL ONCE
Status: DISCONTINUED | OUTPATIENT
Start: 2022-07-27 | End: 2022-07-27 | Stop reason: HOSPADM

## 2022-07-27 RX ADMIN — MAGNESIUM SULFATE HEPTAHYDRATE 2 G: 40 INJECTION, SOLUTION INTRAVENOUS at 10:44

## 2022-07-27 RX ADMIN — SODIUM CHLORIDE, POTASSIUM CHLORIDE, SODIUM LACTATE AND CALCIUM CHLORIDE: 600; 310; 30; 20 INJECTION, SOLUTION INTRAVENOUS at 11:43

## 2022-07-27 RX ADMIN — HYDROMORPHONE HYDROCHLORIDE 0.2 MG: 0.2 INJECTION, SOLUTION INTRAMUSCULAR; INTRAVENOUS; SUBCUTANEOUS at 13:26

## 2022-07-27 RX ADMIN — ROCURONIUM BROMIDE 10 MG: 50 INJECTION, SOLUTION INTRAVENOUS at 11:07

## 2022-07-27 RX ADMIN — LIDOCAINE HYDROCHLORIDE 100 MG: 20 INJECTION, SOLUTION INFILTRATION; PERINEURAL at 10:31

## 2022-07-27 RX ADMIN — SUGAMMADEX 200 MG: 100 INJECTION, SOLUTION INTRAVENOUS at 11:48

## 2022-07-27 RX ADMIN — FENTANYL CITRATE 100 MCG: 50 INJECTION, SOLUTION INTRAMUSCULAR; INTRAVENOUS at 10:29

## 2022-07-27 RX ADMIN — ONDANSETRON 4 MG: 2 INJECTION INTRAMUSCULAR; INTRAVENOUS at 11:38

## 2022-07-27 RX ADMIN — PROPOFOL 150 MG: 10 INJECTION, EMULSION INTRAVENOUS at 10:28

## 2022-07-27 RX ADMIN — HYDROMORPHONE HYDROCHLORIDE 0.5 MG: 1 INJECTION, SOLUTION INTRAMUSCULAR; INTRAVENOUS; SUBCUTANEOUS at 11:47

## 2022-07-27 RX ADMIN — PHENYLEPHRINE HYDROCHLORIDE 100 MCG: 10 INJECTION INTRAVENOUS at 10:46

## 2022-07-27 RX ADMIN — FENTANYL CITRATE 50 MCG: 50 INJECTION, SOLUTION INTRAMUSCULAR; INTRAVENOUS at 11:08

## 2022-07-27 RX ADMIN — FENTANYL CITRATE 50 MCG: 50 INJECTION, SOLUTION INTRAMUSCULAR; INTRAVENOUS at 11:15

## 2022-07-27 RX ADMIN — SODIUM CHLORIDE, POTASSIUM CHLORIDE, SODIUM LACTATE AND CALCIUM CHLORIDE: 600; 310; 30; 20 INJECTION, SOLUTION INTRAVENOUS at 10:16

## 2022-07-27 RX ADMIN — DEXAMETHASONE SODIUM PHOSPHATE 8 MG: 4 INJECTION, SOLUTION INTRA-ARTICULAR; INTRALESIONAL; INTRAMUSCULAR; INTRAVENOUS; SOFT TISSUE at 10:31

## 2022-07-27 RX ADMIN — DEXMEDETOMIDINE 8 MCG: 100 INJECTION, SOLUTION, CONCENTRATE INTRAVENOUS at 11:24

## 2022-07-27 RX ADMIN — ROCURONIUM BROMIDE 40 MG: 50 INJECTION, SOLUTION INTRAVENOUS at 10:31

## 2022-07-27 RX ADMIN — MIDAZOLAM 1 MG: 1 INJECTION INTRAMUSCULAR; INTRAVENOUS at 11:57

## 2022-07-27 RX ADMIN — ACETAMINOPHEN 975 MG: 325 TABLET, FILM COATED ORAL at 10:01

## 2022-07-27 RX ADMIN — PROPOFOL 50 MG: 10 INJECTION, EMULSION INTRAVENOUS at 10:34

## 2022-07-27 RX ADMIN — MIDAZOLAM 1 MG: 1 INJECTION INTRAMUSCULAR; INTRAVENOUS at 10:49

## 2022-07-27 RX ADMIN — HYDROMORPHONE HYDROCHLORIDE 0.2 MG: 0.2 INJECTION, SOLUTION INTRAMUSCULAR; INTRAVENOUS; SUBCUTANEOUS at 13:16

## 2022-07-27 RX ADMIN — DEXMEDETOMIDINE 12 MCG: 100 INJECTION, SOLUTION, CONCENTRATE INTRAVENOUS at 12:08

## 2022-07-27 RX ADMIN — HYDROMORPHONE HYDROCHLORIDE 0.2 MG: 0.2 INJECTION, SOLUTION INTRAMUSCULAR; INTRAVENOUS; SUBCUTANEOUS at 13:32

## 2022-07-27 RX ADMIN — OXYCODONE HYDROCHLORIDE 5 MG: 5 TABLET ORAL at 13:16

## 2022-07-27 RX ADMIN — HYDROMORPHONE HYDROCHLORIDE 0.5 MG: 1 INJECTION, SOLUTION INTRAMUSCULAR; INTRAVENOUS; SUBCUTANEOUS at 11:44

## 2022-07-27 RX ADMIN — HEPARIN SODIUM 5000 UNITS: 5000 INJECTION, SOLUTION INTRAVENOUS; SUBCUTANEOUS at 10:01

## 2022-07-27 RX ADMIN — DEXMEDETOMIDINE 12 MCG: 100 INJECTION, SOLUTION, CONCENTRATE INTRAVENOUS at 11:57

## 2022-07-27 RX ADMIN — PHENAZOPYRIDINE 200 MG: 100 TABLET ORAL at 10:01

## 2022-07-27 ASSESSMENT — LIFESTYLE VARIABLES: TOBACCO_USE: 1

## 2022-07-27 ASSESSMENT — CHADS2 SCORE: AGE GREATER THAN OR EQUAL TO 75: NO

## 2022-07-27 NOTE — ANESTHESIA POSTPROCEDURE EVALUATION
Patient: Rae Hernandez    Procedure: Procedure(s):  laparoscopic removal of both ovaries and fallopian tubes       Anesthesia Type:  General    Note:  Disposition: Outpatient   Postop Pain Control: Uneventful            Sign Out: Well controlled pain   PONV: No   Neuro/Psych: Uneventful            Sign Out: Acceptable/Baseline neuro status   Airway/Respiratory: Uneventful            Sign Out: Acceptable/Baseline resp. status   CV/Hemodynamics: Uneventful            Sign Out: Acceptable CV status; No obvious hypovolemia; No obvious fluid overload   Other NRE:    DID A NON-ROUTINE EVENT OCCUR? YES    Event details/Postop Comments:  Patient has very fragile skin which bruised over b/l arms from positioning. I also had placed an IV in the left hand that went in smoothly but infiltrated a few minutes later leading to a very large hematoma that spread to her fingers and wrist. No medications or fluids were pushed through it and it was removed. The hand was wrapped with ACE bandage. Her skin remained soft with good pulses and perfusion. She was instructed to elevate her hand as much as possible and the hematoma should resolve.            Last vitals:  Vitals Value Taken Time   /78 07/27/22 1300   Temp 36.3  C (97.4  F) 07/27/22 1208   Pulse 71 07/27/22 1309   Resp 20 07/27/22 1245   SpO2 94 % 07/27/22 1309   Vitals shown include unvalidated device data.    Electronically Signed By: Jose Mejia MD  July 27, 2022  1:10 PM

## 2022-07-27 NOTE — ANESTHESIA PREPROCEDURE EVALUATION
Anesthesia Pre-Procedure Evaluation    Patient: Rae Hernandez   MRN: [id number] : 1960        Procedure : Procedure(s):  laparoscopic removal of both ovaries and fallopian tubes, possible cancer surgery          Past Medical History:   Diagnosis Date     MS (multiple sclerosis) (H)      MS (multiple sclerosis) (H)      Neurogenic bladder      Neurogenic bowel      Spastic hemiplegia (H)       Past Surgical History:   Procedure Laterality Date     TONSILLECTOMY        No Known Allergies   Social History     Tobacco Use     Smoking status: Current Every Day Smoker     Years: 45.00     Types: Cigarettes     Start date:      Smokeless tobacco: Never Used   Substance Use Topics     Alcohol use: Yes     Comment: several beers per day      Wt Readings from Last 1 Encounters:   No data found for Wt        Anesthesia Evaluation   Pt has had prior anesthetic.     No history of anesthetic complications       ROS/MED HX  ENT/Pulmonary: Comment: Dysphagia     (+) tobacco use, Current use,     Neurologic:     (+) Multiple Sclerosis,     Cardiovascular:     (+) Dyslipidemia -----    METS/Exercise Tolerance:     Hematologic:    (-) anemia   Musculoskeletal: Comment: Spastic hemiplegia       GI/Hepatic:    (-) GERD   Renal/Genitourinary: Comment: Neurogenic bladder  Ovarian mass   (-) renal disease   Endo:    (-) Type II DM   Psychiatric/Substance Use:     (+) alcohol abuse     Infectious Disease:  - neg infectious disease ROS     Malignancy:   (+) Malignancy, History of Skin.Skin CA Remission status post Surgery.        Other:      (+) , other significant disability Wheelchair bound,          Physical Exam    Airway        Mallampati: III   TM distance: > 3 FB   Neck ROM: limited   Mouth opening: > 3 cm    Respiratory Devices and Support         Dental       (+) partials      Cardiovascular          Rhythm and rate: regular and normal     Pulmonary           breath sounds clear to auscultation           OUTSIDE  LABS:  CBC:   Lab Results   Component Value Date    WBC 8.9 06/28/2022    HGB 14.5 06/28/2022    HCT 45.3 06/28/2022     06/28/2022     BMP:   Lab Results   Component Value Date     06/28/2022    POTASSIUM 3.9 06/28/2022    CHLORIDE 111 (H) 06/28/2022    CO2 24 06/28/2022    BUN 23 06/28/2022    CR 0.58 06/28/2022    GLC 86 07/27/2022    GLC 93 06/28/2022     COAGS: No results found for: PTT, INR, FIBR  POC: No results found for: BGM, HCG, HCGS  HEPATIC:   Lab Results   Component Value Date    ALBUMIN 3.6 06/28/2022    PROTTOTAL 7.3 06/28/2022     (H) 06/28/2022    AST 57 (H) 06/28/2022    ALKPHOS 103 06/28/2022    BILITOTAL 0.6 06/28/2022     OTHER:   Lab Results   Component Value Date    JOSHUA 9.2 06/28/2022       Anesthesia Plan    ASA Status:  3   NPO Status:  NPO Appropriate    Anesthesia Type: General.     - Airway: ETT   Induction: Intravenous.   Maintenance: Balanced.   Techniques and Equipment:     - Lines/Monitors: BIS, 2nd IV     Consents    Anesthesia Plan(s) and associated risks, benefits, and realistic alternatives discussed. Questions answered and patient/representative(s) expressed understanding.    - Discussed:     - Discussed with:  Patient      - Extended Intubation/Ventilatory Support Discussed: No.      - Patient is DNR/DNI Status: No    Use of blood products discussed: No .     Postoperative Care    Pain management: IV analgesics, Oral pain medications, Multi-modal analgesia.   PONV prophylaxis: Ondansetron (or other 5HT-3), Dexamethasone or Solumedrol     Comments:                HEIKE KEVIN MD

## 2022-07-27 NOTE — PROGRESS NOTES
Gynecologic Oncology Postoperative Check Note  7/27/2022    S: Patient reports she is doing fine postoperatively. Pain IS well controlled with Oral pain. Voiding spontaneously. Tolerating crackers and water without nausea or vomiting. Denies chest pain, shortness of breath, dizziness, or other concerns at this time.     O:  Vitals:    07/27/22 1345 07/27/22 1400 07/27/22 1421 07/27/22 1430   BP: 117/68 119/76 (!) 134/92 132/85   BP Location:       Pulse: 64 70 74    Resp: 16 16 16 16   Temp: 98.2  F (36.8  C)  98.2  F (36.8  C)    TempSrc: Oral  Oral    SpO2: 94% 96% 95% 96%   Weight:       Height:           Gen: In no distress  Cardio: RRR, S1/S2, no murmurs  Resp: CTAB, no wheezing or crackles  Abdomen: soft, appropriately tender, incision clean and dry X3  Extremities: Non-tender, mild edema    A: 62 year old POD#0 s/p Laparoscopic bilateral salpingo-oophorectomy. Doing well with no complaints.    Dz: Large ovarian cystic lesion 15.4 cm,  = 8  FEN: Regular diet  Pain: tylenol, ibuprofen and oxycodone PRN  Heme: Hgb 14.5>> EBL 10cc  : spontaneous voiding, removed morris  PPX: SCDs, IS  Dispo: Home  Drains/Lines: PIV    Dispo: To home    Jam Moncada DO, MA  OB/GYN PGY-1  7/27/2022 3:50 PM

## 2022-07-27 NOTE — ANESTHESIA PROCEDURE NOTES
Airway       Patient location during procedure: OR       Procedure Start/Stop Times: 7/27/2022 10:40 AM  Staff -        CRNA: Tim Baze APRN CRNA       Performed By: CRNA  Consent for Airway        Urgency: elective  Indications and Patient Condition       Indications for airway management: inocente-procedural       Induction type:intravenous       Mask difficulty assessment: 1 - vent by mask    Final Airway Details       Final airway type: endotracheal airway       Successful airway: ETT - single  Endotracheal Airway Details        ETT size (mm): 7.0       Cuffed: yes       Cuff volume (mL): 10       Successful intubation technique: video laryngoscopy       VL Blade Size: MAC D Blade       Grade View of Cords: 1 (grade 4 with MAC 3 DL)       Adjucts: stylet       Position: Right       Measured from: lips       Secured at (cm): 22    Post intubation assessment        Placement verified by: capnometry, equal breath sounds and chest rise        Secured with: silk tape       Ease of procedure: easy       Dentition: Intact and Unchanged    Medication(s) Administered   Medication Administration Time: 7/27/2022 10:40 AM

## 2022-07-27 NOTE — ANESTHESIA CARE TRANSFER NOTE
Patient: Rae Hernandez    Procedure: Procedure(s):  laparoscopic removal of both ovaries and fallopian tubes       Diagnosis: Ovarian mass [N83.8]  Diagnosis Additional Information: No value filed.    Anesthesia Type:   General     Note:    Oropharynx: oropharynx clear of all foreign objects and spontaneously breathing  Level of Consciousness: awake  Oxygen Supplementation: room air    Independent Airway: airway patency satisfactory and stable  Dentition: dentition unchanged  Vital Signs Stable: post-procedure vital signs reviewed and stable  Report to RN Given: handoff report given  Patient transferred to: PACU  Comments: Patient transferred to PACU in stable condition, breathing spontaneously on RA, VSS.  Patient combative on wake up, see MAR.  Report given to RN.  Handoff Report: Identifed the Patient, Identified the Reponsible Provider, Reviewed the pertinent medical history, Discussed the surgical course, Reviewed Intra-OP anesthesia mangement and issues during anesthesia, Set expectations for post-procedure period and Allowed opportunity for questions and acknowledgement of understanding      Vitals:  Vitals Value Taken Time   /79 07/27/22 1208   Temp     Pulse 80 07/27/22 1219   Resp     SpO2 95 % 07/27/22 1219   Vitals shown include unvalidated device data.    Electronically Signed By: ARSALAN Boyce CRNA  July 27, 2022  12:20 PM

## 2022-07-27 NOTE — DISCHARGE INSTRUCTIONS
Pain management:   Taking tylenol and ibuprofen (unless MD has told you not to use one of these) on a regular or scheduled basis for the first few days after surgery will be helpful.   You can alternate between tylenol and ibuprofen.   For example, each medication can be taken every 6 hours so you can alternate taking one then the other every 3 hours for continued pain coverage   I.e. ibuprofen at 1200, tylenol at 3pm, ibuprofen at 6pm, etc.   Do not take more than 4,000 mg of tylenol in 24 hours.   We DO expect you to have discomfort/pain at the *** site that progressively gets better and is manageable with tylenol and ibuprofen, ***.  Doing other pain management cares such as rest, ice or heat, and distraction will also help you manage the pain.   If your pain is suddenly worse or not tolerable with this pain plan, please call *** or the hospital phone number provided below.  ____________________________________________________________________________    [facility] // Same-Day Surgery // Adult Discharge Orders & Instructions     Take it easy when you get home.  Remember, same day surgery DOES NOT MEAN SAME DAY RECOVERY! Healing is a gradual process.   You will need some time to recover- you may be more tired than you realize at first.  Rest and relax for at least the first 24 hours at home.  You'll feel better and heal faster if you take good care of yourself.     ANESTHESIA RECOVERY -   For 24 hours after surgery    Get plenty of rest.  A responsible adult must stay with you for at least 24 hours after you leave the hospital.   Do not drive or use heavy equipment.  If you have weakness or tingling, don't drive or use heavy equipment until this feeling goes away.  Do not drink alcohol.  Avoid strenuous or risky activities.  Ask for help when climbing stairs.   You may feel lightheaded.  IF so, sit for a few minutes before standing.  Have someone help you get up.   If you have  nausea (feel sick to your stomach): Drink only clear liquids such as apple juice, ginger ale, broth or 7-Up.  Rest may also help.  Be sure to drink enough fluids.  Move to a regular diet as you feel able.  You may have a slight fever. Call the doctor if your fever is over 100 F (37.7 C) (taken under the tongue) or lasts longer than 24 hours.  You may have a dry mouth, a sore throat, muscle aches or trouble sleeping.  These should go away after 24 hours.  Do not make important or legal decisions.     Call your doctor for any of the followin.  Signs of infection (fever, growing tenderness at the surgery site, a large amount of drainage or bleeding, severe pain, foul-smelling drainage, redness, swelling).  2. It has been over 8 to 10 hours since surgery and you are still not able to urinate (pass water).  3.  Headache for over 24 hours.    To contact a doctor, call:  Dr. Bird @ [phone] Gynecology/Oncology Clinic   [phone] and ask for the resident on call for GYN oncology (answered 24 hours a day)  Emergency Department: [facility]: [phone] (TTY for hearing impaired: [phone])

## 2022-07-27 NOTE — OR NURSING
Patient arrived to Phase 2 with hematoma covering her L hand. Per report from bret, the patient had an IV attempt in the OR and was not successful. Patient arrived with her hand wrapped tightly with coban and gauze. The middle of the patient hand was compressed with the top half and into her fingers a hard ball and all purple. The patient reports it being 8/10 pain. I held pressure and redid the pressure dressing. I notified Dr. Marroquin and she said she would be at the bedside in a minute to assess the patient.

## 2022-07-27 NOTE — OP NOTE
Gynecologic Oncology Operative Report    7/27/2022  Rae Hernandez  [id number]    PREOPERATIVE DIAGNOSIS: Pelvic mass    POSTOPERATIVE DIAGNOSIS: Benign on frozen section, final pathology pending    PROCEDURES: Laparoscopic bilateral salpingo-oophorectomy    SURGEON: Adenike Hernandez MD     ASSISTANTS:  Pedrito Oneal MD, PGY-2, Oz Oleary MD, Fellow.     ANESTHESIA: General endotracheal    ESTIMATED BLOOD LOSS: 10 cc     IV FLUIDS: 1000 cc crystalloid    URINE OUTPUT: 450 cc clear urine     INDICATIONS: Rae Hernandez is a 62 year old who presented with urinary frequency/urgency.  On CT she was found to have a large, cystic ovarian mass but no other evidence of metastatic disease.   was drawn and was normal at 8.  Following a thorough discussion of the risks, benefits, indications and alternatives she consented to the above procedure.    FINDINGS: On EUA the patient had normal external genitalia, a narrow introitus and a palpable and very mobile adnexal mass.  On laparoscopy the right ovary was replaced by a simple appearing large, 15 cm cystic mass.  The uterus and left adnexa were grossly normal in appearance.  The remainder of the abdominal and pelvic surveys were unremarkable. Frozen section results showed benign findings.    SPECIMENS:   1.  Pelvic washings  2.  Right ovary and fallopian tube  3.  Left ovary and fallopian tube    COMPLICATIONS: None.     CONDITION: Stable to PACU.     PROCEDURE:   Consent was reviewed with the patient in the preoperative setting and confirmed. She received heparin for venous thrombosis prevention. The patient was transferred to the operating room and placed in dorsal supine position. General anesthetic was obtained in the usual manner without noted difficulties. The patient was then positioned onto Chidi stirrups and an exam under anesthesia was performed with findings as described above.  The patient was prepped and draped for the above-mentioned procedure and  Lieberman catheter was then placed under sterile techniques.  Timeout was called at which point the patient's name, procedure and operative site was confirmed by the operative team.    Attention was then turned to the upper abdomen. Initially, an incision was made at the umbilicus and the Veress needle introduced through this stab incision. The abdomen was insufflated with an opening pressure of 3 mmHg. The Veress needle was removed. The initial midline 5 mm port was inserted without difficulties and initial survey revealed no damage to underlying structures.  The left lateral 12 mm and right lateral 5 mm ports were then placed under direct visualization without any injury noted to underlying structures. At this point, the patient was placed into steep Trendelenburg. The pelvis was inspected as well as the upper abdomen with findings as noted above. Pelvic washings were obtained and sent for cytology. Bowels were packed up into the upper abdomen with gentle traction. The right ureter was identified transperitoneally and the IP ligament was isolated, cauterized and divided with the LigaSure device.  The right utero-ovarian ligament was then cauterized and divided with LigaSure.  The right ovary and fallopian tube were placed into an EndoCatch bag, ruptured, removed from the abdomen and sent for pathology which did return benign.  The left ureter was identified transperitoneally and the IP ligament was cauterized and divided with the LigaSure device.  The left utero-ovarian ligament was cauterized and divided with the LigaSure device.  The left ovary and fallopian tube were removed from the abdomen, sent for pathology. We closed the fascia on the 12 mm incision using the Matthew-Sebastian device. All laparoscopic instruments and ports were now removed and CO2 allowed to escape from the abdomen. Skin was reapproximated with 4-0 Vicryl sutures and Dermabond applied. The patient tolerated the procedure well and was taken to  the recovery room in stable condition.    Sponge, lap and needle counts were reported as correct x2 and instrument count was correct x1.     Adenike Hernandez MD  Gynecologic Oncology  [facility]

## 2022-07-29 ENCOUNTER — PATIENT OUTREACH (OUTPATIENT)
Dept: ONCOLOGY | Facility: CLINIC | Age: 62
End: 2022-07-29

## 2022-07-29 LAB
PATH REPORT.COMMENTS IMP SPEC: NORMAL
PATH REPORT.FINAL DX SPEC: NORMAL
PATH REPORT.GROSS SPEC: NORMAL
PATH REPORT.INTRAOP OBS SPEC DOC: NORMAL
PATH REPORT.MICROSCOPIC SPEC OTHER STN: NORMAL
PATH REPORT.RELEVANT HX SPEC: NORMAL
PHOTO IMAGE: NORMAL

## 2022-07-29 PROCEDURE — 88307 TISSUE EXAM BY PATHOLOGIST: CPT | Mod: 26 | Performed by: PATHOLOGY

## 2022-07-29 PROCEDURE — 88331 PATH CONSLTJ SURG 1 BLK 1SPC: CPT | Mod: 26 | Performed by: PATHOLOGY

## 2022-07-29 PROCEDURE — 88305 TISSUE EXAM BY PATHOLOGIST: CPT | Mod: 26 | Performed by: PATHOLOGY

## 2022-08-01 NOTE — PROGRESS NOTES
"Late entry from 7/29/2022: Follow up call to pt post surgery with Dr Hernandez, Laparoscopic BSO on 7/27/2022.  Pt states she was doing okay overall. Eating and drinking okay. Has not checked her temperature but does not feel like she has a fever. Normal bladder function although pt has not had a BM yet. Pt was prescribed senekot but states she did not pick it up.  Attempted to review use of senekot and if no relief may take miralax if needed. Instructed on importance of not going past 3 days without a BM, and pt will call if needed.  Positive flatus. Sister in law also on the call with pt and states pt has a dressing on left hand due to \"IV issue.\"  Sister in law states swelling has improved slightly but pt's left hand from wrist to fingers bruised although no further bleeding since this morning.    Dr Hernandez notified and states pt had a significant IV infiltration.  If area continues to bleed, she will need to continue with wraps.    Pt called back and spoke with Meme, sister in law and instructed as noted per Dr Hernandez.  Pt will continue to keep elevated as much as she can. Monitor for bleeding and swelling and if symptoms not improving, call in to on call number.  Provided clinic number and on call process.  Meme verbalized understanding and will call as needed. Post op appt with Dr Hernandez on 8/9/2022.    Gerda Galarza, RN, BSN, OCN    "

## 2022-08-09 ENCOUNTER — ONCOLOGY VISIT (OUTPATIENT)
Dept: ONCOLOGY | Facility: CLINIC | Age: 62
End: 2022-08-09
Attending: OBSTETRICS & GYNECOLOGY
Payer: MEDICARE

## 2022-08-09 VITALS
SYSTOLIC BLOOD PRESSURE: 118 MMHG | RESPIRATION RATE: 16 BRPM | OXYGEN SATURATION: 95 % | DIASTOLIC BLOOD PRESSURE: 75 MMHG | TEMPERATURE: 97.1 F | HEART RATE: 70 BPM

## 2022-08-09 DIAGNOSIS — L02.619 CELLULITIS AND ABSCESS OF FOOT EXCLUDING TOE: ICD-10-CM

## 2022-08-09 DIAGNOSIS — L03.119 CELLULITIS AND ABSCESS OF FOOT EXCLUDING TOE: ICD-10-CM

## 2022-08-09 DIAGNOSIS — N83.8 OVARIAN MASS: Primary | ICD-10-CM

## 2022-08-09 PROCEDURE — 99213 OFFICE O/P EST LOW 20 MIN: CPT | Performed by: OBSTETRICS & GYNECOLOGY

## 2022-08-09 PROCEDURE — G0463 HOSPITAL OUTPT CLINIC VISIT: HCPCS

## 2022-08-09 RX ORDER — SULFAMETHOXAZOLE/TRIMETHOPRIM 800-160 MG
1 TABLET ORAL 2 TIMES DAILY
Qty: 20 TABLET | Refills: 0 | Status: SHIPPED | OUTPATIENT
Start: 2022-08-09

## 2022-08-09 ASSESSMENT — PAIN SCALES - GENERAL: PAINLEVEL: NO PAIN (0)

## 2022-08-09 NOTE — NURSING NOTE
"Oncology Rooming Note    August 9, 2022 1:34 PM   Rae Hernandez is a 62 year old female who presents for:    Chief Complaint   Patient presents with     Oncology Clinic Visit     Ovarian mass         Initial Vitals: /75   Pulse 70   Temp 97.1  F (36.2  C)   Resp 16   SpO2 95%  Estimated body mass index is 27.69 kg/m  as calculated from the following:    Height as of 7/27/22: 1.778 m (5' 10\").    Weight as of 7/27/22: 87.5 kg (193 lb). There is no height or weight on file to calculate BSA.  No Pain (0) Comment: Data Unavailable   No LMP recorded. Patient is postmenopausal.  Allergies reviewed: Yes  Medications reviewed: Yes    Medications: Medication refills not needed today.  Pharmacy name entered into EPIC: [facility] - [place], [place] - [address]    Clinical concerns: follow up  - concerned with left foot - wound on the top of the foot.      Opal Travis, [facility]              "

## 2022-08-09 NOTE — PROGRESS NOTES
Consult Notes on Referred Patient            RE: Rae Hernandez  : 1960  QUAN: Aug 9, 2022    HPI:  Rae Hernandez is 62 year old with benign findings. She is accompanied today by her sister.  She is doing well post-op.  No bowel/bladder concerns.  Minimal pain.   Her sister is concerned about a red spot on the top of her foot which is increasing in size    She was undergoing work up for urinary frequency/urgency and baseline incontinence and was found to have a large pelvic mass.  She denies any constipation, pain.  She is eating and drinking normally.  She does note she has had some abdominal bloating lately. Of note, she is undergoing evaluation for possible new liver disease as her LFT's were found to be mildly elevated on routine lab work.  She has a liver US scheduled for .    22:  US renal:  Large cystic mass above the bladder measuring up to 14.2 cm and may contain echogenic debris.    22:  CT A/P:  1.  Large ovarian cystic lesions in the pelvis measuring up to 15.4 cm.  Due to mass effect from the large size of the lesion, it is difficult to determine which ovary this mass originates from.  No metastatic disease identified.      22:   = 8    22:  Laparoscopic bilateral salpingo-oophorectomy   Pathology:  Bilateral serous cystadenoma    Obstetrics and Gynecology History:  G0  Menopause at age 52, no HRT  No PMB      Past Medical History:  Past Medical History:   Diagnosis Date     History of skin cancer     1990s     MS (multiple sclerosis) (H)      Neurogenic bladder      Neurogenic bowel      Spastic hemiplegia (H)        Past Surgical History:  Past Surgical History:   Procedure Laterality Date     LAPAROSCOPIC SALPINGO-OOPHORECTOMY Bilateral 2022    Procedure: laparoscopic removal of both ovaries and fallopian tubes;  Surgeon: Adenike Chinchilla MD;  Location: [facility] OR     TONSILLECTOMY         Health Maintenance:  Last Pap Smear: 20 years ago               Result: normal  She has not had a history of abnormal Pap smears.    Last Mammogram: Many years ago              Result: normal      She has not had a history of abnormal mammograms.    Last Colonoscopy: Never-pt declines       Social History:  Lives with her sister in law and brother, feels safe at home.  Retired from the [employer].  Enjoys feeding the birds, gardening.  Her wife passed away in 2020 and she moved to [place] from [place].  Does not have an advanced directive on file and would like her sister in law to be her POA.  Would like full resuscitation if reversible cause is identified, however would not like to be kept on life sustaining measures long-term.     Family History:   The patient's family history is significant for.  Family History   Problem Relation Age of Onset     Lung Cancer Mother      Breast Cancer Sister      Lung Cancer Sister          Physical Exam:   /75   Pulse 70   Temp 97.1  F (36.2  C)   Resp 16   SpO2 95%   There is no height or weight on file to calculate BMI.    General Appearance: healthy and alert, no distress  Abd:  Port sites without erythema/induration or drainage  Ext:  Left foot with approximately 3 cm area of erythema with surrounding petechiae.  The center of this is purple with an open area approximately 1 cm in size.  No drainage, no surrounding warmth.  Does not extend up the foot to the ankle or leg.     Labs:  None      Assessment:    Rae Hernandez is a 62 year old woman with serous cystadenoma    A total of 15 minutes was spent on patient care today.       Plan:     1.)    Ovarian cyst-Pathology was reviewed showing benign findings.  Given this she no longer needs follow up with gynecologic oncology.       2.) Genetic risk factors were assessed and the patient does not meet the qualifications for a referral     3.) Labs and/or tests ordered include: None     4.) Health maintenance issues addressed today include pt is due for colonoscopy, mammogram and pap which she  declines all screening exams.    5.) Left foot cellulitis-Prescription for Bactrim sent to pharmacy.  I also instructed her to follow up with her PCP next week to ensure this is improving.  She already has a [facility] and will call to make an appointment.  Discussed concerning signs that she should call with.          Thank you for allowing us to participate in the care of your patient.         Sincerely,    Adenike Hernandez MD  Gynecologic Oncology  [facility]       CC

## 2022-08-09 NOTE — LETTER
2022         RE: Rae Hernandez  [id number] [place] [address]        Dear Colleague,    Thank you for referring your patient, Rae Hernandez, to the [facility]. Please see a copy of my visit note below.    Consult Notes on Referred Patient            RE: Rae Hernandez  : 1960  QUAN: Aug 9, 2022    HPI:  Rae Hernandez is 62 year old with benign findings. She is accompanied today by her sister.  She is doing well post-op.  No bowel/bladder concerns.  Minimal pain.   Her sister is concerned about a red spot on the top of her foot which is increasing in size    She was undergoing work up for urinary frequency/urgency and baseline incontinence and was found to have a large pelvic mass.  She denies any constipation, pain.  She is eating and drinking normally.  She does note she has had some abdominal bloating lately. Of note, she is undergoing evaluation for possible new liver disease as her LFT's were found to be mildly elevated on routine lab work.  She has a liver US scheduled for .    22:  US renal:  Large cystic mass above the bladder measuring up to 14.2 cm and may contain echogenic debris.    22:  CT A/P:  1.  Large ovarian cystic lesions in the pelvis measuring up to 15.4 cm.  Due to mass effect from the large size of the lesion, it is difficult to determine which ovary this mass originates from.  No metastatic disease identified.      22:   = 8    22:  Laparoscopic bilateral salpingo-oophorectomy   Pathology:  Bilateral serous cystadenoma    Obstetrics and Gynecology History:  G0  Menopause at age 52, no HRT  No PMB      Past Medical History:  Past Medical History:   Diagnosis Date     History of skin cancer     1990s     MS (multiple sclerosis) (H)      Neurogenic bladder      Neurogenic bowel      Spastic hemiplegia (H)        Past Surgical History:  Past Surgical History:   Procedure Laterality Date     LAPAROSCOPIC  SALPINGO-OOPHORECTOMY Bilateral 7/27/2022    Procedure: laparoscopic removal of both ovaries and fallopian tubes;  Surgeon: Adenike Chinchilla MD;  Location: [facility] OR     TONSILLECTOMY         Health Maintenance:  Last Pap Smear: 20 years ago              Result: normal  She has not had a history of abnormal Pap smears.    Last Mammogram: Many years ago              Result: normal      She has not had a history of abnormal mammograms.    Last Colonoscopy: Never-pt declines       Social History:  Lives with her sister in law and brother, feels safe at home.  Retired from the [employer].  Enjoys feeding the birds, gardening.  Her wife passed away in 2020 and she moved to [place] from [place].  Does not have an advanced directive on file and would like her sister in law to be her POA.  Would like full resuscitation if reversible cause is identified, however would not like to be kept on life sustaining measures long-term.     Family History:   The patient's family history is significant for.  Family History   Problem Relation Age of Onset     Lung Cancer Mother      Breast Cancer Sister      Lung Cancer Sister          Physical Exam:   /75   Pulse 70   Temp 97.1  F (36.2  C)   Resp 16   SpO2 95%   There is no height or weight on file to calculate BMI.    General Appearance: healthy and alert, no distress  Abd:  Port sites without erythema/induration or drainage  Ext:  Left foot with approximately 3 cm area of erythema with surrounding petechiae.  The center of this is purple with an open area approximately 1 cm in size.  No drainage, no surrounding warmth.  Does not extend up the foot to the ankle or leg.     Labs:  None      Assessment:    Rae Hernandez is a 62 year old woman with serous cystadenoma    A total of 15 minutes was spent on patient care today.       Plan:     1.)    Ovarian cyst-Pathology was reviewed showing benign findings.  Given this she no longer needs follow up with gynecologic oncology.        2.) Genetic risk factors were assessed and the patient does not meet the qualifications for a referral     3.) Labs and/or tests ordered include: None     4.) Health maintenance issues addressed today include pt is due for colonoscopy, mammogram and pap which she declines all screening exams.    5.) Left foot cellulitis-Prescription for Bactrim sent to pharmacy.  I also instructed her to follow up with her PCP next week to ensure this is improving.  She already has a [facility] and will call to make an appointment.  Discussed concerning signs that she should call with.          Thank you for allowing us to participate in the care of your patient.         Sincerely,    Adenike Hernandez MD  Gynecologic Oncology  [facility]       CC          Again, thank you for allowing me to participate in the care of your patient.        Sincerely,        Waylon Hernandez MD

## 2022-08-17 ENCOUNTER — LAB REQUISITION (OUTPATIENT)
Dept: LAB | Facility: CLINIC | Age: 62
End: 2022-08-17

## 2022-08-17 PROCEDURE — 86334 IMMUNOFIX E-PHORESIS SERUM: CPT | Performed by: PATHOLOGY

## 2022-08-17 PROCEDURE — 82595 ASSAY OF CRYOGLOBULIN: CPT

## 2022-08-23 LAB — CRYOGLOB SER QL: ABNORMAL

## 2022-08-25 LAB
ALBUMIN SERPL ELPH-MCNC: ABNORMAL G/DL
CRYOGLOB IGA & IGG & IGM SER-IMP: ABNORMAL
CRYOGLOB TYP SER IFE: ABNORMAL
CRYOGLOB TYP SER IFE: NEGATIVE

## (undated) DEVICE — ENDO TROCAR FIRST ENTRY KII FIOS Z-THRD 12X100MM CTF73

## (undated) DEVICE — ENDO POUCH UNIVERSAL RETRIEVAL SYSTEM INZII 12/15MM CD004

## (undated) DEVICE — NDL INSUFFLATION 13GA 120MM C2201

## (undated) DEVICE — SPECIMEN TRAP MUCOUS 40ML LUKI C30200A

## (undated) DEVICE — KOH COLPOTOMIZER OCCLUDER  CPO-6

## (undated) DEVICE — GLOVE PROTEXIS BLUE W/NEU-THERA 7.0  2D73EB70

## (undated) DEVICE — SOL WATER IRRIG 1000ML BOTTLE 2F7114

## (undated) DEVICE — SUCTION IRR STRYKERFLOW II W/TIP 250-070-520

## (undated) DEVICE — LINEN TOWEL PACK X6 WHITE 5487

## (undated) DEVICE — SOL NACL 0.9% INJ 1000ML BAG 2B1324X

## (undated) DEVICE — ESU LIGASURE LAPAROSCOPIC BLUNT TIP SEALER 5MMX44CM LF1844

## (undated) DEVICE — TUBING SMOKE EVAC PNEUMOCLEAR HIGH FLOW 0620050250

## (undated) DEVICE — Device

## (undated) DEVICE — SOL NACL 0.9% IRRIG 1000ML BOTTLE 2F7124

## (undated) DEVICE — PREP SCRUB SOL EXIDINE 4% CHG 4OZ 29002-404

## (undated) DEVICE — ADH SKIN CLOSURE PREMIERPRO EXOFIN 1.0ML 3470

## (undated) DEVICE — ESU GROUND PAD ADULT W/CORD E7507

## (undated) DEVICE — GLOVE PROTEXIS POWDER FREE SMT 6.5  2D72PT65X

## (undated) DEVICE — SU VICRYL 4-0 PS-2 18" UND J496H

## (undated) DEVICE — SU VICRYL 0 TIE 54" J608H

## (undated) DEVICE — ESU ENDO SCISSORS 5MM CVD 5DCS

## (undated) DEVICE — ANTIFOG SOLUTION W/FOAM PAD CF-1002

## (undated) DEVICE — WIPES FOLEY CARE SURESTEP PROVON DFC100

## (undated) DEVICE — PREP CHLORAPREP 26ML TINTED HI-LITE ORANGE 930815

## (undated) DEVICE — ENDO TROCAR SLEEVE KII Z-THREADED 05X100MM CTS02

## (undated) DEVICE — LINEN TOWEL PACK X30 5481

## (undated) DEVICE — SUCTION MANIFOLD NEPTUNE 2 SYS 4 PORT 0702-020-000

## (undated) DEVICE — KIT PATIENT POSITIONING PIGAZZI LATEX FREE 40580

## (undated) DEVICE — DEVICE SUTURE PASSER 14GA WECK EFX EFXSP2

## (undated) RX ORDER — HYDROMORPHONE HCL IN WATER/PF 6 MG/30 ML
PATIENT CONTROLLED ANALGESIA SYRINGE INTRAVENOUS
Status: DISPENSED
Start: 2022-07-27

## (undated) RX ORDER — FENTANYL CITRATE 50 UG/ML
INJECTION, SOLUTION INTRAMUSCULAR; INTRAVENOUS
Status: DISPENSED
Start: 2022-07-27

## (undated) RX ORDER — LIDOCAINE HYDROCHLORIDE 20 MG/ML
INJECTION, SOLUTION EPIDURAL; INFILTRATION; INTRACAUDAL; PERINEURAL
Status: DISPENSED
Start: 2022-07-27

## (undated) RX ORDER — PROPOFOL 10 MG/ML
INJECTION, EMULSION INTRAVENOUS
Status: DISPENSED
Start: 2022-07-27

## (undated) RX ORDER — HYDROMORPHONE HYDROCHLORIDE 1 MG/ML
INJECTION, SOLUTION INTRAMUSCULAR; INTRAVENOUS; SUBCUTANEOUS
Status: DISPENSED
Start: 2022-07-27

## (undated) RX ORDER — OXYCODONE HYDROCHLORIDE 5 MG/1
TABLET ORAL
Status: DISPENSED
Start: 2022-07-27

## (undated) RX ORDER — ACETAMINOPHEN 325 MG/1
TABLET ORAL
Status: DISPENSED
Start: 2022-07-27

## (undated) RX ORDER — ONDANSETRON 2 MG/ML
INJECTION INTRAMUSCULAR; INTRAVENOUS
Status: DISPENSED
Start: 2022-07-27

## (undated) RX ORDER — HEPARIN SODIUM 5000 [USP'U]/.5ML
INJECTION, SOLUTION INTRAVENOUS; SUBCUTANEOUS
Status: DISPENSED
Start: 2022-07-27

## (undated) RX ORDER — PHENAZOPYRIDINE HYDROCHLORIDE 100 MG/1
TABLET, FILM COATED ORAL
Status: DISPENSED
Start: 2022-07-27

## (undated) RX ORDER — FENTANYL CITRATE-0.9 % NACL/PF 10 MCG/ML
PLASTIC BAG, INJECTION (ML) INTRAVENOUS
Status: DISPENSED
Start: 2022-07-27